# Patient Record
(demographics unavailable — no encounter records)

---

## 2019-02-26 NOTE — CT
CT LUMBAR SPINE WITHOUT CONTRAST:

2/26/19

 

Multiple axial tomograms obtained through the lumbar spine with multiplanar reconstruction.

 

INDICATION:

Low back pain. 

 

The lumbar vertebrae maintain normal height and alignment. There is no evidence of spondylolisthesis 
of spondylolysis. Disc spaces are preserved. 

 

Mild disc bulge at L2-3, L3-4, and L4-5. No evidence of disc protrusion or herniation. No significant
 central canal stenosis. 

 

IMPRESSION:  

Mild disc bulges are seen as noted above. No evidence of disc protrusion or herniation and no signifi
cant central canal stenosis apparent. 

 

 

 

POS: KAREN

## 2020-03-30 NOTE — ULT
THYROID ULTRASOUND:

 

INDICATION: 

Thyroid nodule.

 

COMPARISON: 

There are no comparison exams.  History given of a prior exam performed at [facility].

 

The patient has a history of breast cancer and has been exposed to radiation.

 

FINDINGS: 

Both lobes give a homogeneous background echotexture.  The right lobe measures 5.2 x 1.7 x 2.0 cm.

 

The left lobe measures 5.1 x 1.7 x 1.9 cm.

 

Right Lobe:

A dominant nodule in the superior right lobe has an oblong shape measuring 1.3 x 0.7 x 0.7 cm.  This 
nodule appears solid and is hypoechoic with slightly irregular margins.

 

In the lower pole right lobe there is a 0.7 cm nodule.  There are 2 nodules in the mid pole right lob
e measuring 0.3 to 0.5 cm.  

 

Left Lobe:

There are at least 5 small hypoechoic nodules in the left lobe all measuring in the 3-6 mm range.  

 

IMPRESSION: 

There are multiple bilateral thyroid lobe nodules as described above.  The largest nodule is in the s
uperior right lobe.  This is a TIRADS 4 lesion. Comparison with old exams would be of benefit to dete
rmine if this is a stable nodule.  If the prior films are unavailable, options include ultrasound-brandon
ded fine needle aspirate or short-term followup with repeat exam in 6 months.  Since this lesion is l
ess than 1.5 cm, TIRADS recommendations are 6-month followup exam.

 

POS: SHEREEN

## 2020-04-09 NOTE — ULT
Sonographic guided FNA right thyroid lobe nodule.



HISTORY: Right thyroid lobe mass.



FINDINGS: After explaining the procedure and answering all questions, the lobular hypoechoic mass at 
the posterolateral aspect of the right thyroid lobe as visualized. Sterile technique, buffered

local anesthesia, sonographic guidance, and a medial approach were used to carefully advance a 25-gau
ge needle into the right thyroid lobe mass. A total of 4 FNA passes were made. Tissue was submitted

to laboratory for analysis and processing.



Postprocedure imaging shows no evidence of complication. Patient tolerated the procedure well and was
 dismissed in good condition.



  



IMPRESSION :

Technically successful sonographic guided right thyroid lobe nodule FNA. Pathology is pending.



Reported By: JOSE ROBERTO White 

Electronically Signed:  4/8/2020 9:40 AM

## 2020-07-27 NOTE — ULT
GALLBLADDER ULTRASOUND:

 

HISTORY: 

Nausea, vomiting, mid epigastric pain, breast cancer in 2012.

 

FINDINGS: 

The liver demonstrates mildly increased echogenicity without focal mass or intrahepatic ductal dilata
tion.  The probable hemangiomas noted on the CT scan of 7/7/2020 and not seen on this study.  No intr
ahepatic ductal dilatation is seen.  The gallbladder, right kidney, and visualized portions of the pa
ncreas are unremarkable.  No free fluid is seen.  The common duct measures 3 mm in diameter.

 

IMPRESSION: 

Mild fatty infiltration of the liver.

 

POS: OFF